# Patient Record
Sex: MALE | Race: ASIAN | NOT HISPANIC OR LATINO | URBAN - METROPOLITAN AREA
[De-identification: names, ages, dates, MRNs, and addresses within clinical notes are randomized per-mention and may not be internally consistent; named-entity substitution may affect disease eponyms.]

---

## 2018-03-05 ENCOUNTER — IMPORTED ENCOUNTER (OUTPATIENT)
Dept: URBAN - METROPOLITAN AREA CLINIC 88 | Facility: CLINIC | Age: 58
End: 2018-03-05

## 2019-05-16 ENCOUNTER — IMPORTED ENCOUNTER (OUTPATIENT)
Dept: URBAN - METROPOLITAN AREA CLINIC 88 | Facility: CLINIC | Age: 59
End: 2019-05-16

## 2020-07-15 ENCOUNTER — IMPORTED ENCOUNTER (OUTPATIENT)
Dept: URBAN - METROPOLITAN AREA CLINIC 88 | Facility: CLINIC | Age: 60
End: 2020-07-15

## 2020-11-16 ENCOUNTER — IMPORTED ENCOUNTER (OUTPATIENT)
Dept: URBAN - METROPOLITAN AREA CLINIC 88 | Facility: CLINIC | Age: 60
End: 2020-11-16

## 2021-02-11 ENCOUNTER — PREPPED CHART (OUTPATIENT)
Dept: URBAN - METROPOLITAN AREA CLINIC 113 | Facility: CLINIC | Age: 61
End: 2021-02-11

## 2021-02-11 PROBLEM — H44.2C2 RETINAL DETACHMENT WITH SINGLE BREAK: Noted: 2021-02-11

## 2021-02-11 PROBLEM — H35.373 MACULAR PUCKER: Noted: 2021-02-11

## 2021-02-11 PROBLEM — H35.411 LATTICE DEGENERATION OF RETINA: Noted: 2021-02-11

## 2021-02-11 PROBLEM — E11.3293 MILD NONPROLIFERATIVE DIABETIC RETINOPATHY: Noted: 2021-02-11

## 2021-02-11 PROBLEM — H11.32 SUBCONJUNCTIVAL HEMORRHAGE: Noted: 2021-02-11

## 2021-02-11 PROBLEM — H33.012 RETINAL DETACHMENT WITH SINGLE BREAK: Noted: 2021-02-11

## 2021-02-11 PROBLEM — H44.23 PROGRESSIVE HIGH MYOPIA: Noted: 2021-02-11

## 2022-07-14 ENCOUNTER — IMPORTED ENCOUNTER (OUTPATIENT)
Dept: URBAN - METROPOLITAN AREA CLINIC 88 | Facility: CLINIC | Age: 62
End: 2022-07-14

## 2022-07-15 ASSESSMENT — TONOMETRY
OD_IOP_MMHG: 17
OS_IOP_MMHG: 15

## 2022-07-15 ASSESSMENT — VISUAL ACUITY
OD_CC: 20/40-2
OS_CC: 20/30-2

## 2022-07-18 ENCOUNTER — FOLLOW UP (OUTPATIENT)
Dept: URBAN - METROPOLITAN AREA CLINIC 113 | Facility: CLINIC | Age: 62
End: 2022-07-18

## 2022-07-18 DIAGNOSIS — H11.32: ICD-10-CM

## 2022-07-18 DIAGNOSIS — H44.23: ICD-10-CM

## 2022-07-18 DIAGNOSIS — H25.13: ICD-10-CM

## 2022-07-18 DIAGNOSIS — H35.411: ICD-10-CM

## 2022-07-18 DIAGNOSIS — H44.2C2: ICD-10-CM

## 2022-07-18 DIAGNOSIS — E11.3293: ICD-10-CM

## 2022-07-18 DIAGNOSIS — H35.373: ICD-10-CM

## 2022-07-18 PROCEDURE — 92014 COMPRE OPH EXAM EST PT 1/>: CPT

## 2022-07-18 PROCEDURE — 92202 OPSCPY EXTND ON/MAC DRAW: CPT

## 2022-07-18 PROCEDURE — 92134 CPTRZ OPH DX IMG PST SGM RTA: CPT

## 2022-07-18 ASSESSMENT — TONOMETRY
OD_IOP_MMHG: 14
OS_IOP_MMHG: 11

## 2022-07-18 ASSESSMENT — VISUAL ACUITY
OS_PH: 20/40-2
OS_SC: 20/80-2
OD_SC: 20/40-1

## 2022-11-03 ENCOUNTER — APPOINTMENT (OUTPATIENT)
Age: 62
Setting detail: DERMATOLOGY
End: 2022-11-03

## 2022-11-03 DIAGNOSIS — D485 NEOPLASM OF UNCERTAIN BEHAVIOR OF SKIN: ICD-10-CM

## 2022-11-03 DIAGNOSIS — D22 MELANOCYTIC NEVI: ICD-10-CM

## 2022-11-03 DIAGNOSIS — L85.3 XEROSIS CUTIS: ICD-10-CM

## 2022-11-03 PROBLEM — D48.5 NEOPLASM OF UNCERTAIN BEHAVIOR OF SKIN: Status: ACTIVE | Noted: 2022-11-03

## 2022-11-03 PROBLEM — D22.5 MELANOCYTIC NEVI OF TRUNK: Status: ACTIVE | Noted: 2022-11-03

## 2022-11-03 PROCEDURE — 99203 OFFICE O/P NEW LOW 30 MIN: CPT | Mod: 25

## 2022-11-03 PROCEDURE — 11102 TANGNTL BX SKIN SINGLE LES: CPT

## 2022-11-03 PROCEDURE — ? BIOPSY BY SHAVE METHOD

## 2022-11-03 PROCEDURE — ? COUNSELING

## 2022-11-03 ASSESSMENT — LOCATION SIMPLE DESCRIPTION DERM
LOCATION SIMPLE: UPPER BACK
LOCATION SIMPLE: RIGHT UPPER BACK
LOCATION SIMPLE: LOWER BACK

## 2022-11-03 ASSESSMENT — LOCATION DETAILED DESCRIPTION DERM
LOCATION DETAILED: INFERIOR THORACIC SPINE
LOCATION DETAILED: RIGHT MID-UPPER BACK
LOCATION DETAILED: SUPERIOR LUMBAR SPINE
LOCATION DETAILED: SUPERIOR THORACIC SPINE

## 2022-11-03 ASSESSMENT — LOCATION ZONE DERM: LOCATION ZONE: TRUNK

## 2022-11-03 NOTE — PROCEDURE: BIOPSY BY SHAVE METHOD
Hide Anesthesia Volume?: No
Wound Care: Vaseline
Anesthesia Type: 2% lidocaine with epinephrine and a 1:10 solution of 8.4% sodium bicarbonate
Biopsy Method: Dermablade
Cryotherapy Text: The wound bed was treated with cryotherapy after the biopsy was performed.
Additional Anesthesia Volume In Cc (Will Not Render If 0): 0
Type Of Destruction Used: Curettage
Was A Bandage Applied: Yes
Post-Care Instructions: I reviewed with the patient in detail post-care instructions. Patient is to keep the biopsy clean, and apply vaseline twice daily until healed.
Billing Type: Third-Party Bill
Silver Nitrate Text: The wound bed was treated with silver nitrate after the biopsy was performed.
Electrodesiccation Text: The wound bed was treated with electrodesiccation after the biopsy was performed.
Biopsy Type: H and E
Hemostasis: Aluminum Chloride and Electrocautery
Curettage Text: The wound bed was treated with curettage after the biopsy was performed.
Consent: Verbal consent was obtained and risks were reviewed including but not limited to pain, dyschromia, scarring, infection, bleeding, incomplete removal, recurrence, and additional procedures.
Detail Level: Detailed
Depth Of Biopsy: dermis
Information: Selecting Yes will display possible errors in your note based on the variables you have selected. This validation is only offered as a suggestion for you. PLEASE NOTE THAT THE VALIDATION TEXT WILL BE REMOVED WHEN YOU FINALIZE YOUR NOTE. IF YOU WANT TO FAX A PRELIMINARY NOTE YOU WILL NEED TO TOGGLE THIS TO 'NO' IF YOU DO NOT WANT IT IN YOUR FAXED NOTE.
Dressing: bandage
Electrodesiccation And Curettage Text: The wound bed was treated with electrodesiccation and curettage after the biopsy was performed.
Notification Instructions: Patient will be notified of biopsy results. However, patient instructed to call the office if not contacted within 2-3 weeks.

## 2023-06-01 ENCOUNTER — APPOINTMENT (OUTPATIENT)
Age: 63
Setting detail: DERMATOLOGY
End: 2023-06-01

## 2023-06-01 DIAGNOSIS — D22 MELANOCYTIC NEVI: ICD-10-CM

## 2023-06-01 DIAGNOSIS — L30.9 DERMATITIS, UNSPECIFIED: ICD-10-CM | Status: INADEQUATELY CONTROLLED

## 2023-06-01 PROBLEM — D22.5 MELANOCYTIC NEVI OF TRUNK: Status: ACTIVE | Noted: 2023-06-01

## 2023-06-01 PROCEDURE — ? PHOTO-DOCUMENTATION

## 2023-06-01 PROCEDURE — ? ADDITIONAL NOTES

## 2023-06-01 PROCEDURE — ? SHAVE REMOVAL

## 2023-06-01 PROCEDURE — ? DIAGNOSIS COMMENT

## 2023-06-01 PROCEDURE — ? EDUCATIONAL RESOURCES PROVIDED

## 2023-06-01 PROCEDURE — 99214 OFFICE O/P EST MOD 30 MIN: CPT | Mod: 25

## 2023-06-01 PROCEDURE — ? COUNSELING

## 2023-06-01 PROCEDURE — ? PRESCRIPTION

## 2023-06-01 PROCEDURE — 11302 SHAVE SKIN LESION 1.1-2.0 CM: CPT

## 2023-06-01 RX ORDER — HYDROXYZINE HYDROCHLORIDE 10 MG/1
TABLET, FILM COATED ORAL
Qty: 30 | Refills: 2 | Status: ERX | COMMUNITY
Start: 2023-06-01

## 2023-06-01 RX ORDER — TRIAMCINOLONE ACETONIDE 1 MG/G
CREAM TOPICAL TWICE DAILY
Qty: 453.6 | Refills: 1 | Status: ERX | COMMUNITY
Start: 2023-06-01

## 2023-06-01 RX ADMIN — HYDROXYZINE HYDROCHLORIDE: 10 TABLET, FILM COATED ORAL at 00:00

## 2023-06-01 RX ADMIN — TRIAMCINOLONE ACETONIDE: 1 CREAM TOPICAL at 00:00

## 2023-06-01 ASSESSMENT — LOCATION SIMPLE DESCRIPTION DERM
LOCATION SIMPLE: UPPER BACK
LOCATION SIMPLE: RIGHT UPPER BACK

## 2023-06-01 ASSESSMENT — LOCATION DETAILED DESCRIPTION DERM
LOCATION DETAILED: RIGHT MID-UPPER BACK
LOCATION DETAILED: INFERIOR THORACIC SPINE

## 2023-06-01 ASSESSMENT — LOCATION ZONE DERM: LOCATION ZONE: TRUNK

## 2023-06-01 ASSESSMENT — BSA RASH: BSA RASH: 80

## 2023-06-01 ASSESSMENT — SEVERITY ASSESSMENT: SEVERITY: SEVERE

## 2023-06-01 ASSESSMENT — ITCH NUMERIC RATING SCALE: HOW SEVERE IS YOUR ITCHING?: 9

## 2023-06-01 NOTE — PROCEDURE: PHOTO-DOCUMENTATION
Photo Preface (Leave Blank If You Do Not Want): Photographs were obtained today
Details (Free Text): Right back
Detail Level: Zone

## 2023-06-01 NOTE — PROCEDURE: ADDITIONAL NOTES
Render Risk Assessment In Note?: yes
Additional Notes: Possible drug rash, he has been on his meds for years, no change in dosage. Itching on entire body, his DM is controlled. Will treat with antihistamines and topical steroids , if not better plan biopsy and oral steroid use
Detail Level: Simple

## 2023-06-01 NOTE — PROCEDURE: SHAVE REMOVAL
Accession #: [id number]
Size Of Lesion In Cm (Required): 1.2
Render Path Notes In Note?: No
Detail Level: Detailed
Anesthesia Type: 2% lidocaine without epinephrine
Depth Of Shave: dermis
Billing Type: Third-Party Bill
Wound Care: Petrolatum
Size Of Margin In Cm (Margins Are Not Added To Billing Dimensions): 0.1
Anesthesia Volume In Cc: 0.8
Consent was obtained from the patient. The risks and benefits to therapy were discussed in detail. Specifically, the risks of infection, scarring, bleeding, prolonged wound healing, incomplete removal, allergy to anesthesia, nerve injury and recurrence were addressed. Prior to the procedure, the treatment site was clearly identified and confirmed by the patient. All components of Universal Protocol/PAUSE Rule completed.
Medical Necessity Information: It is in your best interest to select a reason for this procedure from the list below. All of these items fulfill various CMS LCD requirements except the new and changing color options.
Path Notes Override (Will Replace All Of The Above Text): PLEASE CHECK MARGINS
Notification Instructions: Patient will be notified of pathology results. However, patient instructed to call the office if not contacted within 2 weeks.
Biopsy Method: Dermablade
X Size Of Lesion In Cm (Optional): 0
Was A Bandage Applied: Yes
Hemostasis: Electrocautery and Aluminum Chloride
Post-Care Instructions: I reviewed with the patient in detail post-care instructions. Patient is to keep the biopsy site dry overnight, and then apply bacitracin twice daily until healed. Patient may apply hydrogen peroxide soaks to remove any crusting.
Medical Necessity Clause: This procedure was medically necessary because the lesion that was treated was:

## 2023-07-03 ENCOUNTER — PREPPED CHART (OUTPATIENT)
Dept: URBAN - METROPOLITAN AREA CLINIC 88 | Facility: CLINIC | Age: 63
End: 2023-07-03

## 2023-07-13 ENCOUNTER — ESTABLISHED COMPREHENSIVE EXAM (OUTPATIENT)
Dept: URBAN - METROPOLITAN AREA CLINIC 88 | Facility: CLINIC | Age: 63
End: 2023-07-13

## 2023-07-13 DIAGNOSIS — Z79.4: ICD-10-CM

## 2023-07-13 DIAGNOSIS — H35.371: ICD-10-CM

## 2023-07-13 DIAGNOSIS — H40.023: ICD-10-CM

## 2023-07-13 DIAGNOSIS — H25.813: ICD-10-CM

## 2023-07-13 DIAGNOSIS — E11.3293: ICD-10-CM

## 2023-07-13 PROCEDURE — 92250 FUNDUS PHOTOGRAPHY W/I&R: CPT

## 2023-07-13 PROCEDURE — 92014 COMPRE OPH EXAM EST PT 1/>: CPT

## 2023-07-13 PROCEDURE — 92083 EXTENDED VISUAL FIELD XM: CPT

## 2023-07-13 ASSESSMENT — VISUAL ACUITY
OS_PH: 20/40-1
OS_CC: 20/50-2
OD_CC: 20/40-2

## 2023-07-13 ASSESSMENT — TONOMETRY
OD_IOP_MMHG: 11
OS_IOP_MMHG: 9

## 2023-10-14 ASSESSMENT — TONOMETRY
OS_IOP_MMHG: 12
OS_IOP_MMHG: 15
OD_IOP_MMHG: 15
OD_IOP_MMHG: 12
OS_IOP_MMHG: 14
OD_IOP_MMHG: 12
OS_IOP_MMHG: 17
OD_IOP_MMHG: 16

## 2023-10-14 ASSESSMENT — VISUAL ACUITY
OD_SC: 20/60
OS_CC: 20/30+
OD_CC: 20/40+
OS_SC: 20/< 20/200
OS_CC: 20/30-2
OS_CC: 20/30+
OD_CC: 20/60+2
OD_CC: 20/30-

## 2023-12-21 ENCOUNTER — FOLLOW UP (OUTPATIENT)
Dept: URBAN - METROPOLITAN AREA CLINIC 113 | Facility: CLINIC | Age: 63
End: 2023-12-21

## 2023-12-21 DIAGNOSIS — E11.3293: ICD-10-CM

## 2023-12-21 DIAGNOSIS — H35.373: ICD-10-CM

## 2023-12-21 DIAGNOSIS — H33.012: ICD-10-CM

## 2023-12-21 DIAGNOSIS — H35.411: ICD-10-CM

## 2023-12-21 PROCEDURE — 92014 COMPRE OPH EXAM EST PT 1/>: CPT

## 2023-12-21 PROCEDURE — 92134 CPTRZ OPH DX IMG PST SGM RTA: CPT

## 2023-12-21 PROCEDURE — 92202 OPSCPY EXTND ON/MAC DRAW: CPT

## 2023-12-21 ASSESSMENT — VISUAL ACUITY
OS_PH: 20/40
OD_CC: 20/50+2
OS_CC: 20/50-2

## 2023-12-21 ASSESSMENT — TONOMETRY
OS_IOP_MMHG: 13
OD_IOP_MMHG: 14

## 2024-11-25 ENCOUNTER — FOLLOW UP (OUTPATIENT)
Dept: URBAN - METROPOLITAN AREA CLINIC 88 | Facility: CLINIC | Age: 64
End: 2024-11-25

## 2024-11-25 DIAGNOSIS — H35.411: ICD-10-CM

## 2024-11-25 DIAGNOSIS — H33.012: ICD-10-CM

## 2024-11-25 DIAGNOSIS — H25.813: ICD-10-CM

## 2024-11-25 DIAGNOSIS — H40.023: ICD-10-CM

## 2024-11-25 DIAGNOSIS — H35.373: ICD-10-CM

## 2024-11-25 DIAGNOSIS — E11.3293: ICD-10-CM

## 2024-11-25 DIAGNOSIS — Z98.890: ICD-10-CM

## 2024-11-25 PROCEDURE — 92083 EXTENDED VISUAL FIELD XM: CPT

## 2024-11-25 PROCEDURE — 99214 OFFICE O/P EST MOD 30 MIN: CPT

## 2024-11-25 PROCEDURE — 92250 FUNDUS PHOTOGRAPHY W/I&R: CPT

## 2024-11-25 ASSESSMENT — VISUAL ACUITY
OS_SC: 20/150
OD_SC: 20/50
OS_PH: 20/50

## 2024-11-25 ASSESSMENT — KERATOMETRY
OD_K1POWER_DIOPTERS: 37.25
OD_AXISANGLE2_DEGREES: 177
OS_AXISANGLE_DEGREES: 87
OD_K2POWER_DIOPTERS: 37.50
OS_AXISANGLE2_DEGREES: 177
OS_K2POWER_DIOPTERS: 38.50
OD_AXISANGLE_DEGREES: 87
OS_K1POWER_DIOPTERS: 37.50

## 2024-11-25 ASSESSMENT — TONOMETRY
OD_IOP_MMHG: 14
OS_IOP_MMHG: 17

## 2025-01-02 ENCOUNTER — CONSULTATION (OUTPATIENT)
Dept: URBAN - METROPOLITAN AREA CLINIC 88 | Facility: CLINIC | Age: 65
End: 2025-01-02

## 2025-01-02 DIAGNOSIS — H33.012: ICD-10-CM

## 2025-01-02 DIAGNOSIS — Z98.890: ICD-10-CM

## 2025-01-02 DIAGNOSIS — E11.3293: ICD-10-CM

## 2025-01-02 DIAGNOSIS — H52.212: ICD-10-CM

## 2025-01-02 DIAGNOSIS — H35.411: ICD-10-CM

## 2025-01-02 DIAGNOSIS — H25.813: ICD-10-CM

## 2025-01-02 DIAGNOSIS — H40.023: ICD-10-CM

## 2025-01-02 DIAGNOSIS — H35.373: ICD-10-CM

## 2025-01-02 PROCEDURE — 92133 CPTRZD OPH DX IMG PST SGM ON: CPT

## 2025-01-02 PROCEDURE — 92136 OPHTHALMIC BIOMETRY: CPT

## 2025-01-02 PROCEDURE — 99214 OFFICE O/P EST MOD 30 MIN: CPT

## 2025-01-02 PROCEDURE — 92025 CPTRIZED CORNEAL TOPOGRAPHY: CPT

## 2025-01-02 ASSESSMENT — KERATOMETRY
OD_AXISANGLE2_DEGREES: 177
OS_K1POWER_DIOPTERS: 37.50
OD_K2POWER_DIOPTERS: 37.50
OS_AXISANGLE2_DEGREES: 177
OD_K1POWER_DIOPTERS: 37.25
OD_AXISANGLE_DEGREES: 87
OS_AXISANGLE_DEGREES: 87
OS_K2POWER_DIOPTERS: 38.50

## 2025-01-02 ASSESSMENT — VISUAL ACUITY
OS_SC: 20/100+1
OD_SC: 20/40
OS_PH: 20/50-2

## 2025-01-02 ASSESSMENT — TONOMETRY
OS_IOP_MMHG: 13
OD_IOP_MMHG: 11

## 2025-06-11 ENCOUNTER — SURGERY/PROCEDURE (OUTPATIENT)
Dept: URBAN - METROPOLITAN AREA SURGICAL CENTER 19 | Facility: SURGICAL CENTER | Age: 65
End: 2025-06-11

## 2025-06-11 DIAGNOSIS — H25.811: ICD-10-CM

## 2025-06-11 PROCEDURE — MISCFEMTO FEMTO

## 2025-06-11 PROCEDURE — 66984 XCAPSL CTRC RMVL W/O ECP: CPT | Mod: 79,RT

## 2025-06-12 ENCOUNTER — 1 DAY POST-OP (OUTPATIENT)
Dept: URBAN - METROPOLITAN AREA CLINIC 88 | Facility: CLINIC | Age: 65
End: 2025-06-12

## 2025-06-12 DIAGNOSIS — Z96.1: ICD-10-CM

## 2025-06-12 PROCEDURE — 99024 POSTOP FOLLOW-UP VISIT: CPT

## 2025-06-12 ASSESSMENT — KERATOMETRY
OD_K1POWER_DIOPTERS: 37.25
OS_AXISANGLE2_DEGREES: 177
OD_AXISANGLE2_DEGREES: 177
OS_K1POWER_DIOPTERS: 37.50
OD_AXISANGLE_DEGREES: 87
OD_AXISANGLE_DEGREES: 87
OS_K1POWER_DIOPTERS: 37.50
OD_K1POWER_DIOPTERS: 37.25
OS_K2POWER_DIOPTERS: 38.50
OS_AXISANGLE2_DEGREES: 177
OS_AXISANGLE_DEGREES: 87
OD_AXISANGLE2_DEGREES: 177
OS_K2POWER_DIOPTERS: 38.50
OD_K2POWER_DIOPTERS: 37.50
OD_K2POWER_DIOPTERS: 37.50
OS_AXISANGLE_DEGREES: 87

## 2025-06-12 ASSESSMENT — VISUAL ACUITY
OD_SC: 20/25
OU_SC: J3

## 2025-06-12 ASSESSMENT — TONOMETRY
OD_IOP_MMHG: 17
OS_IOP_MMHG: 16

## 2025-06-19 ENCOUNTER — POST-OP CHECK (OUTPATIENT)
Dept: URBAN - METROPOLITAN AREA CLINIC 88 | Facility: CLINIC | Age: 65
End: 2025-06-19

## 2025-06-19 DIAGNOSIS — Z96.1: ICD-10-CM

## 2025-06-19 PROCEDURE — 99024 POSTOP FOLLOW-UP VISIT: CPT

## 2025-06-19 ASSESSMENT — KERATOMETRY
OD_K1POWER_DIOPTERS: 37.25
OS_K1POWER_DIOPTERS: 37.50
OS_AXISANGLE2_DEGREES: 177
OS_K2POWER_DIOPTERS: 38.50
OD_AXISANGLE_DEGREES: 87
OS_AXISANGLE_DEGREES: 87
OD_K2POWER_DIOPTERS: 37.50
OD_AXISANGLE2_DEGREES: 177

## 2025-06-19 ASSESSMENT — VISUAL ACUITY
OS_SC: 20/25
OD_SC: 20/20

## 2025-06-19 ASSESSMENT — TONOMETRY
OS_IOP_MMHG: 11
OD_IOP_MMHG: 10